# Patient Record
Sex: MALE | Race: WHITE | NOT HISPANIC OR LATINO | URBAN - METROPOLITAN AREA
[De-identification: names, ages, dates, MRNs, and addresses within clinical notes are randomized per-mention and may not be internally consistent; named-entity substitution may affect disease eponyms.]

---

## 2017-06-28 ENCOUNTER — OFFICE VISIT - RIVER FALLS (OUTPATIENT)
Dept: FAMILY MEDICINE | Facility: CLINIC | Age: 52
End: 2017-06-28

## 2017-06-28 ASSESSMENT — MIFFLIN-ST. JEOR: SCORE: 1948.49

## 2022-02-11 VITALS
SYSTOLIC BLOOD PRESSURE: 128 MMHG | HEART RATE: 67 BPM | BODY MASS INDEX: 31.97 KG/M2 | DIASTOLIC BLOOD PRESSURE: 84 MMHG | WEIGHT: 236 LBS | TEMPERATURE: 97.7 F | HEIGHT: 72 IN

## 2022-02-16 NOTE — PROGRESS NOTES
Patient:   VIVIANA LOUIS            MRN: [id number]            FIN: [id number]               Age:   51 years     Sex:  Male     :  1965   Associated Diagnoses:   None   Author:   Anatoly Edouard MD      Visit Information      Date of Service: 2017 07:52 am  Performing Location: [facility]  Encounter#: [id number]      Primary Care Provider (PCP):  Fox Rolon MD    NPI# [id number]      Chief Complaint   2017 7:55 AM CDT    Pt c/o warts on both hands. Has had them for years.        History of Present Illness   CC as above and reviewed w  patient   Pt presents for warts on his hands. They've been there for some time but there's a large one on his right 3rd finger that is catching on things now.       Review of Systems   Integumentary:  Has a history of psoriasis.       Health Status   Allergies:    Allergic Reactions (Selected)  No known allergies   Medications:  (Selected)   Documented Medications  Documented  Steroid Cream from Derm doc.: Steroid Cream from Derm doc., Instructions: Doc makes the cream., Supply, 0 Refill(s), Type: Maintenance   Problem list:    All Problems  Obesity / SNOMED CT 8756276831 / Probable  Psoriasis / SNOMED CT 74823621 / Confirmed      Histories   Past Medical History:    Active  Psoriasis ([id number])   Family History:    No family history items have been selected or recorded.   Procedure history:    No active procedure history items have been selected or recorded.      Physical Examination   Vital Signs   2017 7:55 AM CDT Temperature Tympanic 97.7 DegF  LOW    Peripheral Pulse Rate 67 bpm    Systolic Blood Pressure 128 mmHg    Diastolic Blood Pressure 84 mmHg    Mean Arterial Pressure 99 mmHg      Measurements from flowsheet : Measurements   2017 7:55 AM CDT Height Measured - Standard 72 in    Weight Measured - Standard 236 lb    BSA 2.33 m2    Body Mass Index 32 kg/m2      Skin cutaneous warts on bilateral hands, one large on on R 3rd  finger.      Impression and Plan   Cutaneous warts  discussed treatment options today and risks and benefits - pt opted for cryotherpay on his R hand. 4 warts were treated with cryotherapy. He may consider candin injection in the future if this is not successful